# Patient Record
(demographics unavailable — no encounter records)

---

## 2025-01-02 NOTE — PHYSICAL EXAM
[Alert] : alert [Normal Sclera/Conjunctiva] : normal sclera/conjunctiva [Normal Outer Ear/Nose] : the ears and nose were normal in appearance [No Neck Mass] : no neck mass was observed [No Respiratory Distress] : no respiratory distress [Normal PMI] : the apical impulse was normal [Carotids Normal] : carotid pulses were normal with no bruits [Normal Bowel Sounds] : normal bowel sounds [No CVA Tenderness] : no ~M costovertebral angle tenderness [No Stigmata of Cushings Syndrome] : no stigmata of Cushings Syndrome [Oriented x3] : oriented to person, place, and time

## 2025-01-02 NOTE — HISTORY OF PRESENT ILLNESS
[FreeTextEntry1] : This is a pleasant 66 yr old F seen today for a new patient visit for hx of Hypothyroidism  She has a hx of Hypothyroidism for about 10 years and is on Levothyroxine. Current dose is 100 mcg, 1 tab daily.  She reports having a thyroid Biopsy about 10 yrs ago.  She reports having a Thyroid US done last year at [facility]- results not available at the time of this visit.

## 2025-01-02 NOTE — HISTORY OF PRESENT ILLNESS
[FreeTextEntry1] : This is a pleasant 66 yr old F seen today for a new patient visit for hx of Hypothyroidism  She has a hx of Hypothyroidism for about 10 years and is on Levothyroxine. Current dose is 100 mcg, 1 tab daily.  She reports having a thyroid Biopsy about 10 yrs ago.  She reports having a Thyroid US done last year at [facility]- results not available at the time of this visit.

## 2025-01-16 NOTE — ASSESSMENT
[FreeTextEntry1] : Polyarthralgias, myalgia, fatigue, oral ulcers? Dx with SLE in 1995. No hx of internal organ involvement.  Doing well on long standing HCQ 200mg BID. LBP- stable  - repeat labs. will call pt with results - c/w HCQ 200mg BID - up to date with opthalmology f/u - c/w etodolac 200mg BID prn - PT  Discussed treatment plan with the patient and her . The patient was given the opportunity to ask questions and all questions were answered to their satisfaction.

## 2025-01-16 NOTE — PHYSICAL EXAM
[General Appearance - Alert] : alert [General Appearance - In No Acute Distress] : in no acute distress [General Appearance - Well Nourished] : well nourished [General Appearance - Well Developed] : well developed [Sclera] : the sclera and conjunctiva were normal [Outer Ear] : the ears and nose were normal in appearance [Neck Appearance] : the appearance of the neck was normal [Heart Sounds] : normal S1 and S2 [Abdomen Soft] : soft [] : no rash [No Focal Deficits] : no focal deficits [Oriented To Time, Place, And Person] : oriented to person, place, and time [Musculoskeletal - Swelling] : no joint swelling seen

## 2025-01-16 NOTE — HISTORY OF PRESENT ILLNESS
[FreeTextEntry1] : Pt presenting today for a f.u visit for SLE. Last seen 07/2024. Chart reviewed since .  Reports to feel well overall.  No acute complaints today.  Currently taking HCQ, etodolac prn. Tolerating medication well. No side effects. Doing PT for LBP.  Denies fever, chills, CP, SOB, abd pain, rashes, oral.nasal ulcers.  ROS otherwise unchanged from .

## 2025-07-17 NOTE — HISTORY OF PRESENT ILLNESS
[FreeTextEntry1] : Pt presenting today for a f.u visit for SLE. Last seen 01/2025. Chart reviewed since .  Reports to feel well overall.  No acute complaints today.  Currently taking HCQ, etodolac prn. Tolerating medication well. No side effects. Doing PT for LBP.  Denies fever, chills, CP, SOB, abd pain, rashes, oral.nasal ulcers.  ROS otherwise unchanged from .

## 2025-07-17 NOTE — ASSESSMENT
[FreeTextEntry1] : Polyarthralgias, myalgia, fatigue, oral ulcers? Dx with SLE in 1995. No hx of internal organ involvement.  Doing well on long standing HCQ 200mg BID. LBP- stable  - repeat labs - c/w HCQ 200mg BID - up to date with opthalmology f/u - c/w etodolac 200mg BID prn - PT  Discussed treatment plan with the patient and her . The patient was given the opportunity to ask questions and all questions were answered to their satisfaction.